# Patient Record
Sex: FEMALE | Race: WHITE | NOT HISPANIC OR LATINO | URBAN - METROPOLITAN AREA
[De-identification: names, ages, dates, MRNs, and addresses within clinical notes are randomized per-mention and may not be internally consistent; named-entity substitution may affect disease eponyms.]

---

## 2018-02-28 VITALS
WEIGHT: 180.12 LBS | HEIGHT: 67 IN | OXYGEN SATURATION: 96 % | RESPIRATION RATE: 16 BRPM | HEART RATE: 66 BPM | TEMPERATURE: 98 F | DIASTOLIC BLOOD PRESSURE: 56 MMHG | SYSTOLIC BLOOD PRESSURE: 117 MMHG

## 2018-02-28 NOTE — PATIENT PROFILE ADULT. - TEACHING/LEARNING LEARNING PREFERENCES
verbal instruction/individual instruction/written material skill demonstration/written material/individual instruction/verbal instruction

## 2018-02-28 NOTE — PATIENT PROFILE ADULT. - PSH
H/O inguinal hernia repair  L   H/O laparoscopy  of uterus 1984  H/O lumpectomy  x2   H/O parathyroidectomy    H/O:  section  86, 89  History of appendectomy    History of surgery  L knee repair 2013  History of surgery  cardiac ablation 2013  S/P bunionectomy  b/l ,

## 2018-03-01 ENCOUNTER — INPATIENT (INPATIENT)
Facility: HOSPITAL | Age: 63
LOS: 0 days | Discharge: HOME | DRG: 581 | End: 2018-03-02
Attending: PLASTIC SURGERY | Admitting: PLASTIC SURGERY
Payer: COMMERCIAL

## 2018-03-01 DIAGNOSIS — Z98.890 OTHER SPECIFIED POSTPROCEDURAL STATES: Chronic | ICD-10-CM

## 2018-03-01 DIAGNOSIS — Z98.891 HISTORY OF UTERINE SCAR FROM PREVIOUS SURGERY: Chronic | ICD-10-CM

## 2018-03-01 PROCEDURE — 93010 ELECTROCARDIOGRAM REPORT: CPT

## 2018-03-01 RX ORDER — SODIUM CHLORIDE 9 MG/ML
1000 INJECTION, SOLUTION INTRAVENOUS
Qty: 0 | Refills: 0 | Status: DISCONTINUED | OUTPATIENT
Start: 2018-03-01 | End: 2018-03-02

## 2018-03-01 RX ORDER — ACETAMINOPHEN 500 MG
975 TABLET ORAL EVERY 8 HOURS
Qty: 0 | Refills: 0 | Status: DISCONTINUED | OUTPATIENT
Start: 2018-03-01 | End: 2018-03-02

## 2018-03-01 RX ORDER — OXYCODONE HYDROCHLORIDE 5 MG/1
5 TABLET ORAL EVERY 4 HOURS
Qty: 0 | Refills: 0 | Status: DISCONTINUED | OUTPATIENT
Start: 2018-03-01 | End: 2018-03-02

## 2018-03-01 RX ORDER — ONDANSETRON 8 MG/1
4 TABLET, FILM COATED ORAL EVERY 8 HOURS
Qty: 0 | Refills: 0 | Status: DISCONTINUED | OUTPATIENT
Start: 2018-03-01 | End: 2018-03-02

## 2018-03-01 RX ORDER — BUPIVACAINE 13.3 MG/ML
20 INJECTION, SUSPENSION, LIPOSOMAL INFILTRATION ONCE
Qty: 0 | Refills: 0 | Status: DISCONTINUED | OUTPATIENT
Start: 2018-03-01 | End: 2018-03-02

## 2018-03-01 RX ORDER — SPIRONOLACTONE 25 MG/1
1 TABLET, FILM COATED ORAL
Qty: 0 | Refills: 0 | COMMUNITY

## 2018-03-01 RX ORDER — HYDROMORPHONE HYDROCHLORIDE 2 MG/ML
0.5 INJECTION INTRAMUSCULAR; INTRAVENOUS; SUBCUTANEOUS
Qty: 0 | Refills: 0 | Status: DISCONTINUED | OUTPATIENT
Start: 2018-03-01 | End: 2018-03-02

## 2018-03-01 RX ORDER — CEFAZOLIN SODIUM 1 G
2000 VIAL (EA) INJECTION EVERY 8 HOURS
Qty: 0 | Refills: 0 | Status: COMPLETED | OUTPATIENT
Start: 2018-03-01 | End: 2018-03-02

## 2018-03-01 RX ORDER — ENOXAPARIN SODIUM 100 MG/ML
40 INJECTION SUBCUTANEOUS DAILY
Qty: 0 | Refills: 0 | Status: DISCONTINUED | OUTPATIENT
Start: 2018-03-02 | End: 2018-03-02

## 2018-03-01 RX ORDER — OXYCODONE HYDROCHLORIDE 5 MG/1
10 TABLET ORAL EVERY 4 HOURS
Qty: 0 | Refills: 0 | Status: DISCONTINUED | OUTPATIENT
Start: 2018-03-01 | End: 2018-03-02

## 2018-03-01 RX ORDER — ACETAMINOPHEN 500 MG
1000 TABLET ORAL EVERY 8 HOURS
Qty: 0 | Refills: 0 | Status: COMPLETED | OUTPATIENT
Start: 2018-03-01 | End: 2018-03-01

## 2018-03-01 RX ADMIN — HYDROMORPHONE HYDROCHLORIDE 0.5 MILLIGRAM(S): 2 INJECTION INTRAMUSCULAR; INTRAVENOUS; SUBCUTANEOUS at 14:08

## 2018-03-01 RX ADMIN — Medication 1000 MILLIGRAM(S): at 19:32

## 2018-03-01 RX ADMIN — SODIUM CHLORIDE 100 MILLILITER(S): 9 INJECTION, SOLUTION INTRAVENOUS at 22:10

## 2018-03-01 RX ADMIN — Medication 100 MILLIGRAM(S): at 16:38

## 2018-03-01 RX ADMIN — Medication 400 MILLIGRAM(S): at 19:07

## 2018-03-01 RX ADMIN — HYDROMORPHONE HYDROCHLORIDE 0.5 MILLIGRAM(S): 2 INJECTION INTRAMUSCULAR; INTRAVENOUS; SUBCUTANEOUS at 13:55

## 2018-03-01 RX ADMIN — OXYCODONE HYDROCHLORIDE 5 MILLIGRAM(S): 5 TABLET ORAL at 18:22

## 2018-03-01 RX ADMIN — OXYCODONE HYDROCHLORIDE 5 MILLIGRAM(S): 5 TABLET ORAL at 19:25

## 2018-03-02 VITALS
TEMPERATURE: 98 F | RESPIRATION RATE: 18 BRPM | DIASTOLIC BLOOD PRESSURE: 67 MMHG | HEART RATE: 70 BPM | OXYGEN SATURATION: 96 % | SYSTOLIC BLOOD PRESSURE: 105 MMHG

## 2018-03-02 RX ADMIN — Medication 975 MILLIGRAM(S): at 05:55

## 2018-03-02 RX ADMIN — Medication 100 MILLIGRAM(S): at 00:49

## 2018-03-02 RX ADMIN — Medication 975 MILLIGRAM(S): at 06:55

## 2018-03-02 RX ADMIN — Medication 100 MILLIGRAM(S): at 07:55

## 2018-03-02 RX ADMIN — ENOXAPARIN SODIUM 40 MILLIGRAM(S): 100 INJECTION SUBCUTANEOUS at 00:48

## 2018-03-02 NOTE — DISCHARGE NOTE ADULT - CARE PLAN
Principal Discharge DX:	History of surgery  Goal:	Recovery  Assessment and plan of treatment:	Please follow Dr. Blankenship's discharge instructions.

## 2018-03-02 NOTE — PROGRESS NOTE ADULT - ASSESSMENT
Patient seen and examined. Doing well.   PE: Gen: standing comfortably in NAD        Abdomen: soft, NT, ND, no erythema, incision site c/d/i, umbilicus pink and viable, JPs serosanguenous- R-140, L-65 since surgery        Flanks: lipo sites c/d/i, no erythema, no collections, moderate echymosis    Plan: compression garment        : GAURAV drains to self suction        : d/c home today

## 2018-03-02 NOTE — DISCHARGE NOTE ADULT - MEDICATION SUMMARY - MEDICATIONS TO TAKE
I will START or STAY ON the medications listed below when I get home from the hospital:    Aldactone 100 mg oral tablet  -- 1 tab(s) by mouth once a day  -- Indication: For Home med    pravastatin 10 mg oral tablet  -- 1 tab(s) by mouth once a day  -- Indication: For Home med

## 2018-03-02 NOTE — DISCHARGE NOTE ADULT - HOSPITAL COURSE
Patient was admitted for abdominoplasty and liposuction of flanks. The patient tolerated the procedure well. There were no complications. The patient was extubated in the OR and transferred to the PACU in stable condition. The patient was brought to the  floor in stable condition. Diet was advanced as tolerated to a regular diet and pain medication was transitioned to PO. On day of discharge the patient's vitals signs were stable, was tolerating a regular diet, pain was well controlled with PO pain medications and the patient was ambulating.  Patient was discharged with prescriptions for pain medication and instructions for followup.

## 2018-03-02 NOTE — DISCHARGE NOTE ADULT - CARE PROVIDER_API CALL
Tracy Blankenship), Plastic Surgery  [facility] [address] [place] [address]  Phone: [phone]  Fax: [phone]

## 2018-03-02 NOTE — DISCHARGE NOTE ADULT - PATIENT PORTAL LINK FT
You can access the [facility] Patient Portal, offered by [facility], by registering with the following website: [email or web address]

## 2018-03-02 NOTE — PROGRESS NOTE ADULT - SUBJECTIVE AND OBJECTIVE BOX
patient doing well this AM, complains of discomfort when moving around however, has been OOB to the restroom multiple times this morning.     Vital Signs Last 24 Hrs  T(C): 36.4 (02 Mar 2018 05:13), Max: 36.4 (01 Mar 2018 18:24)  T(F): 97.5 (02 Mar 2018 05:13), Max: 97.6 (01 Mar 2018 21:22)  HR: 70 (02 Mar 2018 05:13) (70 - 84)  BP: 105/67 (02 Mar 2018 05:13) (105/67 - 125/78)  BP(mean): 77 (01 Mar 2018 12:36) (77 - 96)  RR: 18 (02 Mar 2018 05:13) (10 - 21)  SpO2: 96% (02 Mar 2018 05:13) (92% - 97%)  I&O's Detail    01 Mar 2018 07:01  -  02 Mar 2018 07:00  --------------------------------------------------------  IN:    IV PiggyBack: 50 mL    lactated ringers.: 1650 mL  Total IN: 1700 mL    OUT:    Bulb: 145 mL    Bulb: 62 mL    Voided: 2600 mL  Total OUT: 2807 mL    Total NET: -1107 mL      NAD  abdomen soft, binder in place  no signs of bleeding  drain serosanguinous

## 2018-03-02 NOTE — PROGRESS NOTE ADULT - ASSESSMENT
POD#1 s/p liposuction and abdominoplasty, doing well overall  -OOB  -DVT ppx  -Pain control  -regular diet  -continue abdominal binder  -anticipate discharge later today

## 2018-03-05 PROCEDURE — 93005 ELECTROCARDIOGRAM TRACING: CPT

## 2018-03-06 DIAGNOSIS — Z85.3 PERSONAL HISTORY OF MALIGNANT NEOPLASM OF BREAST: ICD-10-CM

## 2018-03-06 DIAGNOSIS — M62.08 SEPARATION OF MUSCLE (NONTRAUMATIC), OTHER SITE: ICD-10-CM

## 2018-03-06 DIAGNOSIS — E28.2 POLYCYSTIC OVARIAN SYNDROME: ICD-10-CM

## 2018-03-06 DIAGNOSIS — L98.7 EXCESSIVE AND REDUNDANT SKIN AND SUBCUTANEOUS TISSUE: ICD-10-CM

## 2018-03-06 DIAGNOSIS — Z82.61 FAMILY HISTORY OF ARTHRITIS: ICD-10-CM

## 2018-03-06 DIAGNOSIS — M85.80 OTHER SPECIFIED DISORDERS OF BONE DENSITY AND STRUCTURE, UNSPECIFIED SITE: ICD-10-CM

## 2018-03-06 DIAGNOSIS — E88.1 LIPODYSTROPHY, NOT ELSEWHERE CLASSIFIED: ICD-10-CM

## 2018-03-06 DIAGNOSIS — Z82.49 FAMILY HISTORY OF ISCHEMIC HEART DISEASE AND OTHER DISEASES OF THE CIRCULATORY SYSTEM: ICD-10-CM

## 2018-03-06 DIAGNOSIS — E78.5 HYPERLIPIDEMIA, UNSPECIFIED: ICD-10-CM

## 2018-03-06 DIAGNOSIS — Z88.5 ALLERGY STATUS TO NARCOTIC AGENT: ICD-10-CM

## 2019-12-30 NOTE — PATIENT PROFILE ADULT. - SURGICAL SITE INCISION
Patient is 37 year old Female with a significant PMHx of DM, MI, coronary stent, anemia, recent blood transfusion in July and significant PSHx of kidney transplant presents to the ED with c/o headache, nausea, cough, chest tightness like "someone is sitting on her chest". Patient reports that the nonbloody vomiting (salad came out), non bloody diarrhea started around 6 am in the morning  and the vomiting resolved around 9 am. Patient states she had salad yesterday in the lunch with colleagues. Patient states that a similar headache occurred in the past and it is recurring. Pt states she is also having difficulty breathing and she becomes easily fatigue. Pt states she has a hx of anemia in the past 2/2 to heavy menstruation, she finished her periods today. Pt was told her blood count was 7.2 a week ago. pt had last transfusion in july 2019 for anemia. Pt states she feels her left leg is feeling numb since she came in to the hospital.
no

## 2020-12-01 NOTE — DISCHARGE NOTE ADULT - CAREGIVER RELATION TO PATIENT
Quality 111:Pneumonia Vaccination Status For Older Adults: Pneumococcal Vaccination not Administered or Previously Received, Reason not Otherwise Specified Detail Level: Detailed Quality 155 (Denominator): Falls Plan Of Care: Plan of Care not Documented, Reason not Otherwise Specified Quality 226: Preventive Care And Screening: Tobacco Use: Screening And Cessation Intervention: Patient screened for tobacco use and is an ex/non-smoker Quality 154 Part B: Falls: Risk Screening (Should Be Reported With Measure 155.): Patient screened for future fall risk; documentation of no falls in the past year or only one fall without injury in the past year Quality 154 Part A: Falls: Risk Assessment (Should Be Reported With Measure 155.): Falls risk assessment completed and documented in the past 12 months. Quality 47: Advance Care Plan: Advance care planning not documented, reason not otherwise specified. Quality 110: Preventive Care And Screening: Influenza Immunization: Influenza Immunization Administered during Influenza season same Quality 431: Preventive Care And Screening: Unhealthy Alcohol Use - Screening: Patient screened for unhealthy alcohol use using a single question and scores less than 2 times per year Quality 402: Tobacco Use And Help With Quitting Among Adolescents: Patient screened for tobacco and never smoked

## 2021-04-23 NOTE — PRE-OP CHECKLIST - VERIFY SURGICAL SITE/SIDE WITH PATIENT
-- DO NOT REPLY / DO NOT REPLY ALL --  -- Message is from the [facility]--    COVID-19 Universal Screening: N/A - Not about scheduling    General Patient Message      Reason for Call: Patient will be dropping off her FMLA paperwork today    Caller Information       Type Contact Phone    04/23/2021 12:58 PM CDT Phone (Incoming) Eneida Grewal (Self) [phone] (X)          Alternative phone number: none    Turnaround time given to caller:   \"This message will be sent to [state Provider's name]. The clinical team will fulfill your request as soon as they review your message.\"     done

## 2025-05-02 NOTE — PATIENT PROFILE ADULT. - NS TRANSFER EYEGLASSES PAIRS
ORTHOPAEDIC HAND, WRIST, AND ELBOW OFFICE  VISIT      ASSESSMENT/PLAN:      Assessment & Plan  Bilateral trigger thumb  Anatomy of the condition/s as well as treatment options and expected outcomes were discussed.  Any pertinent imaging or lab results were reviewed with the patient.  The patient verbalized understanding of exam findings and treatment plan.   The patient was given the opportunity to ask questions.  Questions were answered to the patient's satisfaction.  The patient decided to move forward with bilateral trigger thumb CSI's and bilateral thumb CMC CSI's. The CSI's were performed in the office without complication. Post injection protocol/expectations were reviewed.   Follow up 6 weeks, may cancel if doing well.   Orders:    Hand/upper extremity injection: bilateral thumb A1    Arthritis of carpometacarpal (CMC) joint of both thumbs  Anatomy of the condition/s as well as treatment options and expected outcomes were discussed.  Any pertinent imaging or lab results were reviewed with the patient.  The patient verbalized understanding of exam findings and treatment plan.   The patient was given the opportunity to ask questions.  Questions were answered to the patient's satisfaction.  The patient decided to move forward with bilateral trigger thumb CSI's and bilateral thumb CMC CSI's. The CSI's were performed in the office without complication. Post injection protocol/expectations were reviewed.   Information was provided in AVS regarding modalities that can be beneficial for OA.   Follow up PRN.   Orders:    Small joint arthrocentesis: bilateral thumb CMC        Follow Up:  6 weeks PRN      To Do Next Visit:  Re-evaluation of current issue      Discussions:  Trigger Finger: The anatomy and physiology of trigger finger was discussed with the patient today in the office.  Edema and increased contact pressure within the flexor tendons at the A1 pulley can cause pain, crepitation, and triggering or locking of  the digit resulting in limitation of function.  Symptoms can occur at anytime but are typically worse in the morning or after a brief rest from repetitive activity.  Treatment options include resting/nighttime MP blocking splints to decrease edema, oral anti-inflammatory medications, home or formal therapy exercises, up to 2 steroid injections within the tendon sheath, or surgical release.  While majority of patients do respond to conservative treatment, up to 20% may require surgical release.  and Thumb CMC Arthritis: The anatomy and physiology of carpometacarpal joint arthritis was discussed with the patient today in the office.  Deterioration of the articular cartilage eventually leads to hypermobility at the thumb CMC joint, resulting in joint subluxation, osteophyte formation, cystic changes within the trapezium and base of the first metacarpal, as well as subchondral sclerosis.  Eventually, pain, limited mobility, and compensatory hyperextension at the metacarpophalangeal joint may develop.  While normal activity and usage of the thumb joint may provide a painful experience to the patient, this typically does not result in damage to the thumb or hand.  Treatment options include resting thumb spica splints to decreased joint edema, pain, and inflammation.  Therapy exercises to strengthen the thenar musculature may relieve pain, but do not alter the overall continued development of osteoarthritis.  Oral medications, topical medications, corticosteroid injections may decrease pain and increase overall function.  Eventually, approximately 5% of patients may require surgical intervention.       Bret Salgado MD  Attending, Orthopaedic Surgery  Hand, Wrist, and Elbow Surgery  [facility] Orth[facility]    ______________________________________________________________________________________________    CHIEF COMPLAINT:  Chief Complaint   Patient presents with    Right Thumb - Pain     Worse than the left        Left Thumb - Pain       SUBJECTIVE:  Patient is a 57 y.o. RHD male who presents today for thumb pain. He notes occasional pain to his CMC and MP joints. He also notes his right thumb has been locking occasionally. Similar symptoms on the left, just not as frequent or bothersome at this time. He underwent a previous left thumb CMC SI in 2023, which was beneficial for him. He does take Meloxicam as needed and has a pain pump.      Occupation: disabled      I have personally reviewed all the relevant PMH, PSH, SH, FH, Medications and allergies      PAST MEDICAL HISTORY:  Past Medical History:   Diagnosis Date    Anaphylactic reaction     Chronic pain     Depression     Diabetes (HCC)     Diabetes mellitus (HCC)     Fibromyalgia, primary     GERD (gastroesophageal reflux disease)     Hyperlipidemia     Hypertension     Low testosterone     Neuropathy     Pilonidal cyst     last assessed 3/17/2014    Pneumonia     Sleep apnea     no cpap       PAST SURGICAL HISTORY:  Past Surgical History:   Procedure Laterality Date    BACK SURGERY      discectomy    ELBOW SURGERY Right     sometime between [id number]    MOUTH SURGERY      perm top teeth removed and dental implants    OTHER SURGICAL HISTORY  03/13/2015    Electr analysis of progr impl pump w/reprogram refill, requiring physician.  Replacement of right abdomen intrathecal  pain pump filled with Dilaudid with electronic analysis and programming.  managed by Bobby Coy    PILONIDAL CYST EXCISION      WI NDSC WRST SURG W/RLS TRANSVRS CARPL LIGM Right 09/06/2023    Procedure: RELEASE CARPAL TUNNEL ENDOSCOPIC;  Surgeon: Bret Salgado MD;  Location: [facility] MAIN OR;  Service: Orthopedics    WI NDSC WRST SURG W/RLS TRANSVRS CARPL LIGM Left 09/21/2023    Procedure: RELEASE CARPAL TUNNEL ENDOSCOPIC;  Surgeon: Bret Salgado MD;  Location: [facility] MAIN OR;  Service: Orthopedics    WI SURGICAL ARTHROSCOPY SHOULDER W/ROTATOR CUFF RPR Right 02/28/2020    Procedure:  SHOULDER ARTHROSCOPIC ROTATOR CUFF REPAIR AND DEBRIDEMENT;  Surgeon: Wero De La Cruz MD;  Location: [facility]  MAIN OR;  Service: Orthopedics    ROTATOR CUFF REPAIR Left     WISDOM TOOTH EXTRACTION         FAMILY HISTORY:  Family History   Problem Relation Age of Onset    Alcohol abuse Mother             Liver disease Mother     Arthritis Mother             Uterine cancer Mother     Lupus Mother     Coronary artery disease Father             Heart disease Father     Prostate cancer Father             Diabetes Sister     Diabetes unspecified Sister     Hypertension Sister     Hyperlipidemia Sister         pure hypercholesterolemia    Diabetes unspecified Brother         DM    Hypertension Brother     Hyperlipidemia Brother         pure hypercholesterolemia    Diabetes Brother     Diabetes Brother         DM    Hypertension Family        SOCIAL HISTORY:  Social History     Tobacco Use    Smoking status: Former     Current packs/day: 0.00     Types: Cigarettes     Quit date: 2018     Years since quittin.1    Smokeless tobacco: Never   Vaping Use    Vaping status: Never Used   Substance Use Topics    Alcohol use: Not Currently     Comment: rare    Drug use: Never       MEDICATIONS:    Current Outpatient Medications:     atorvastatin (LIPITOR) 20 mg tablet, TAKE ONE TABLET BY MOUTH DAILY AT BEDTIME, Disp: 90 tablet, Rfl: 1    chlorhexidine (PERIDEX) 0.12 % solution, TAKE 15 MLS SWISH AND SPIT FOR 1 MINUTE AFTER NORMAL BRUSHING AND HYGIENE ROUTINE, REPEAT UP TO THREE TIMES A DAY, Disp: , Rfl:     Cholecalciferol (VITAMIN D) 2000 units CAPS, Take 1 capsule by mouth daily, Disp: , Rfl:     gabapentin (NEURONTIN) 300 mg capsule, Take 3 capsules (900 mg total) by mouth daily at bedtime, Disp: 270 capsule, Rfl: 1    HYDROmorphone (Dilaudid) 4 mg/mL injection, Inject 1 Device as directed daily  , Disp: , Rfl:     lisinopril (ZESTRIL) 20 mg tablet, TAKE ONE TABLET BY MOUTH EVERY DAY, Disp: 90  "tablet, Rfl: 1    meloxicam (MOBIC) 15 mg tablet, Take 1 tablet (15 mg total) by mouth daily as needed for moderate pain, Disp: 90 tablet, Rfl: 1    metFORMIN (GLUCOPHAGE-XR) 500 mg 24 hr tablet, TAKE ONE TABLET BY MOUTH TWICE A DAY WITH MEALS, Disp: 180 tablet, Rfl: 1    multivitamin (THERAGRAN) TABS, Take 1 tablet by mouth daily, Disp: , Rfl:     NEEDLE, DISP, 23 G 23G X 1-1/4\" MISC, Use once a week, Disp: 100 each, Rfl: 2    omeprazole (PriLOSEC) 40 MG capsule, TAKE ONE CAPSULE BY MOUTH EVERY DAY, Disp: 90 capsule, Rfl: 1    Upadacitinib ER (Rinvoq) 15 MG TB24, Take 15 mg by mouth daily, Disp: , Rfl:     Blood Glucose Monitoring Suppl (Contour Next EZ) w/Device KIT, Use 1 each 4 (four) times a day (Patient not taking: Reported on 5/2/2025), Disp: 1 kit, Rfl: 0    EPINEPHrine (EPIPEN) 0.3 mg/0.3 mL SOAJ, Inject 0.3 mL (0.3 mg total) into a muscle once for 1 dose (Patient taking differently: Inject 0.3 mg into a muscle once As needed), Disp: 0.6 mL, Rfl: 0    glucose blood (Contour Next Test) test strip, Use 1 each 4 (four) times a day (Patient not taking: Reported on 12/19/2024), Disp: 400 each, Rfl: 1    Microlet Lancets MISC, Use 4 (four) times a day (Patient not taking: Reported on 12/19/2024), Disp: 400 each, Rfl: 1    testosterone cypionate (DEPO-TESTOSTERONE) 200 mg/mL SOLN, INJECT 0.25 MLS WEEKLY*USE 1 VIAL FOR 1 WEEKLY DOSE,DISCARD REMAINDER (weekly dose of 50 mg/week), Disp: 12 mL, Rfl: 1    ALLERGIES:  Allergies   Allergen Reactions    Amoxicillin Anaphylaxis     BP dropped           REVIEW OF SYSTEMS:  Musculoskeletal:        As noted in HPI.   All other systems reviewed and are negative.    VITALS:  There were no vitals filed for this visit.    LABS:  HgA1c:   Lab Results   Component Value Date    HGBA1C 6.3 03/18/2025     BMP:   Lab Results   Component Value Date    GLUCOSE 94 11/30/2015    CALCIUM 9.5 04/30/2025     11/30/2015    K 4.5 04/30/2025    CO2 30 04/30/2025     04/30/2025    BUN " 15 04/30/2025    CREATININE 0.82 04/30/2025       _____________________________________________________  PHYSICAL EXAMINATION:  General: Well developed and well nourished, alert & oriented x 3, appears comfortable  Psychiatric: Normal  HEENT: Normocephalic, Atraumatic Trachea Midline, No torticollis  Pulmonary: No audible wheezing or respiratory distress   Abdomen/GI: Non tender, non distended   Cardiovascular: No pitting edema, 2+ radial pulse   Skin: No masses, erythema, lacerations, fluctation, ulcerations  Neurovascular: Sensation Intact to the Median, Ulnar, Radial Nerve, Motor Intact to the Median, Ulnar, Radial Nerve, and Pulses Intact  Musculoskeletal: Normal, except as noted in detailed exam and in HPI.      MUSCULOSKELETAL EXAMINATION:    Bilateral thumb:     No erythema, ecchymosis or edema  A1 pulley tenderness  A1 pulley nodule    + clicking   CMC tenderness   Full digital extension   Full composite fist     ___________________________________________________  STUDIES REVIEWED:  No new studies to review         PROCEDURES PERFORMED:  Small joint arthrocentesis: bilateral thumb CMC  [place] Protocol:  procedure performed by consultantConsent: Verbal consent obtained. Written consent not obtained.  Risks and benefits: risks, benefits and alternatives were discussed  Consent given by: patient  Patient understanding: patient states understanding of the procedure being performed  Site marked: the operative site was marked  Patient identity confirmed: verbally with patient  Supporting Documentation  Indications: pain   Procedure Details  Location: thumb - bilateral thumb CMC  Needle size: 25 G  Ultrasound guidance: no    Medications (Right): 1 mL ropivacaine 0.2 %; 6 mg betamethasone acetate-betamethasone sodium phosphate 6 (3-3) mg/mLMedications (Left): 1 mL ropivacaine 0.2 %; 6 mg betamethasone acetate-betamethasone sodium phosphate 6 (3-3) mg/mL   Patient tolerance: patient tolerated the procedure well  with no immediate complications  Dressing:  Sterile dressing applied      Hand/upper extremity injection: bilateral thumb A1  Universal Protocol:  procedure performed by consultantConsent: Verbal consent obtained. Written consent not obtained.  Risks and benefits: risks, benefits and alternatives were discussed  Consent given by: patient  Patient understanding: patient states understanding of the procedure being performed  Site marked: the operative site was marked  Patient identity confirmed: verbally with patient  Supporting Documentation  Indications: pain   Procedure Details  Condition:trigger finger Location: thumb - bilateral thumb A1   Needle size: 25 G  Ultrasound guidance: no  Medications (Right): 1 mL ropivacaine 0.5 %; 6 mg betamethasone acetate-betamethasone sodium phosphate 6 (3-3) mg/mLMedications (Left): 1 mL ropivacaine 0.5 %; 6 mg betamethasone acetate-betamethasone sodium phosphate 6 (3-3) mg/mL   Patient tolerance: patient tolerated the procedure well with no immediate complications  Dressing:  Sterile dressing applied         -  _____________________________________________________      Scribe Attestation      I,:  Christine Hough MA am acting as a scribe while in the presence of the attending physician.:       I,:  Bret Salgado MD personally performed the services described in this documentation    as scribed in my presence.:              1 pair